# Patient Record
Sex: MALE | Race: WHITE
[De-identification: names, ages, dates, MRNs, and addresses within clinical notes are randomized per-mention and may not be internally consistent; named-entity substitution may affect disease eponyms.]

---

## 2020-03-15 ENCOUNTER — HOSPITAL ENCOUNTER (INPATIENT)
Dept: HOSPITAL 56 - MW.ED | Age: 65
LOS: 1 days | Discharge: HOME | DRG: 710 | End: 2020-03-16
Attending: INTERNAL MEDICINE | Admitting: INTERNAL MEDICINE
Payer: COMMERCIAL

## 2020-03-15 DIAGNOSIS — I10: ICD-10-CM

## 2020-03-15 DIAGNOSIS — L03.317: ICD-10-CM

## 2020-03-15 DIAGNOSIS — E78.00: ICD-10-CM

## 2020-03-15 DIAGNOSIS — Z79.82: ICD-10-CM

## 2020-03-15 DIAGNOSIS — E66.01: ICD-10-CM

## 2020-03-15 DIAGNOSIS — K61.1: ICD-10-CM

## 2020-03-15 DIAGNOSIS — A41.9: Primary | ICD-10-CM

## 2020-03-15 DIAGNOSIS — F17.210: ICD-10-CM

## 2020-03-15 LAB
BUN SERPL-MCNC: 8 MG/DL (ref 7–18)
CHLORIDE SERPL-SCNC: 93 MMOL/L (ref 98–107)
CO2 SERPL-SCNC: 24.3 MMOL/L (ref 21–32)
GLUCOSE SERPL-MCNC: 347 MG/DL (ref 74–106)
POTASSIUM SERPL-SCNC: 4 MMOL/L (ref 3.5–5.1)
SODIUM SERPL-SCNC: 133 MMOL/L (ref 136–148)

## 2020-03-15 PROCEDURE — 0D9P0ZZ DRAINAGE OF RECTUM, OPEN APPROACH: ICD-10-PCS | Performed by: SURGERY

## 2020-03-15 NOTE — CT
CT pelvis



Technique: Multiple axial sections through the pelvis were obtained.  
Reconstructed coronal and sagittal images were obtained.  Intravenous contrast 
was utilized.  No oral or rectal contrast was given.



Findings: Soft tissue fullness is seen to the right side of the rectum.  
Borders of this are ill-defined and measure around 5.2 cm.  No well-defined 
abscess is seen.  Radiographically this finding may represent infection as well 
as neoplasm.  Perirectal fat is preserved.



Other findings: Minimal cyst is noted within the right kidney.  Appendix is 
felt to be visualized and is normal.  No pelvic mass or adenopathy is seen.



Bone window settings were reviewed which show no acute osseous finding.  
Degenerative change visualized within the spine with anterior osteophytes as 
well as vacuum disc phenomena within the L5-S1 disc.  Degenerative apophyseal 
change is also noted.



Impression:

1.  Soft tissue fullness to the right side of the rectum.  No well-defined 
abscess is seen.  As mentioned above, this finding may represent infection as 
well as neoplasm.

2.  Other findings as noted above which are felt to be nonacute and incidental.



Diagnostic code #3



Study was dictated in MDT
MTDD

## 2020-03-15 NOTE — PCM.HP.2
H&P History of Present Illness





- General


Date of Service: 03/15/20


Admit Problem/Dx: 


 Admission Diagnosis/Problem





Admission Diagnosis/Problem      Perirectal abscess











- History of Present Illness


Initial Comments - Free Text/Narative: 





64 yo male who presents with several day history of pain and pressure above his 

rectum.  He has notice purulent drainage.  In the ED it started to drain alot 

more and he felt the pressure relieve.  He reports several years ago have a 

perirectal abscess that was treated with antibiotics.  He denies any fevers.


  ** Left Buttock


Pain Score (Numeric/FACES): 8





- Related Data


Allergies/Adverse Reactions: 


 Allergies











Allergy/AdvReac Type Severity Reaction Status Date / Time


 


No Known Allergies Allergy   Verified 03/15/20 11:23











Home Medications: 


 Home Meds





Aspirin [Edgar Chewable Aspirin] 1 tab PO DAILY 10/20/14 [History]











Past Medical History


Cardiovascular History: Reports: High Cholesterol, Hypertension


Dermatologic History: Reports: Other (See Below)


Other Dermatologic History: Surigcal draining of abscess





- Infectious Disease History


Infectious Disease History: Reports: Chicken Pox, Mumps





- Past Surgical History


Cardiovascular Surgical History: Reports: None


Dermatological Surgical History: Reports: None





Social & Family History





- Family History


Family Medical History: Noncontributory





- Tobacco Use


Smoking Status *Q: Current Every Day Smoker


Years of Tobacco use: 30


Packs/Tins Daily: 1





- Caffeine Use


Caffeine Use: Reports: Coffee, Soda





- Recreational Drug Use


Recreational Drug Use: No





H&P Review of Systems





- Review of Systems:


Review Of Systems: Comprehensive ROS is negative, except as noted in HPI.





Exam





- Exam


Exam: See Below





- Vital Signs


Vital Signs: 


 Last Vital Signs











Temp  36.6 C   03/15/20 13:51


 


Pulse  118 H  03/15/20 13:51


 


Resp  16   03/15/20 13:51


 


BP  159/105 H  03/15/20 13:51


 


Pulse Ox  96   03/15/20 13:51











Weight: 90.718 kg





- Exam


General: Alert, Oriented


HEENT: Mucosa Moist & Pink


Neck: Supple


Lungs: Clear to Auscultation, Normal Respiratory Effort


Cardiovascular: Regular Rate, Regular Rhythm


GI/Abdominal Exam: Soft, Non-Tender


Rectal (Males) Exam: Other (4-5 cm area of induration above the rectum with 

central area of fluctuance, no purulent dranage was noted but bed and breif was 

wet with rust colored fluid)





- Patient Data


Lab Results Last 24 hrs: 


 Laboratory Results - last 24 hr











  03/15/20 03/15/20 03/15/20 Range/Units





  11:27 11:27 13:55 


 


WBC  11.74 H    (4.0-11.0)  K/uL


 


RBC  5.42    (4.50-5.90)  M/uL


 


Hgb  19.2 H    (13.0-17.0)  g/dL


 


Hct  51.8 H    (38.0-50.0)  %


 


MCV  95.6    (80.0-98.0)  fL


 


MCH  35.4 H    (27.0-32.0)  pg


 


MCHC  37.1 H    (31.0-37.0)  g/dL


 


RDW Std Deviation  42.1    (28.0-62.0)  fl


 


RDW Coeff of Mikaela  12    (11.0-15.0)  %


 


Plt Count  162    (150-400)  K/uL


 


MPV  11.20    (7.40-12.00)  fL


 


Neut % (Auto)  67.7    (48.0-80.0)  %


 


Lymph % (Auto)  19.8    (16.0-40.0)  %


 


Mono % (Auto)  11.8    (0.0-15.0)  %


 


Eos % (Auto)  0.3    (0.0-7.0)  %


 


Baso % (Auto)  0.4    (0.0-1.5)  %


 


Neut # (Auto)  7.9 H    (1.4-5.7)  K/uL


 


Lymph # (Auto)  2.3    (0.6-2.4)  K/uL


 


Mono # (Auto)  1.4 H    (0.0-0.8)  K/uL


 


Eos # (Auto)  0.0    (0.0-0.7)  K/uL


 


Baso # (Auto)  0.1    (0.0-0.1)  K/uL


 


Nucleated RBC %  0.0    /100WBC


 


Nucleated RBCs #  0    K/uL


 


Lactate    2.4 H*  (0.20-2.00)  mmol/L


 


Sodium   133 L   (136-148)  mmol/L


 


Potassium   4.0   (3.5-5.1)  mmol/L


 


Chloride   93 L   ()  mmol/L


 


Carbon Dioxide   24.3   (21.0-32.0)  mmol/L


 


BUN   8   (7.0-18.0)  mg/dL


 


Creatinine   1.0   (0.8-1.3)  mg/dL


 


Est Cr Clr Drug Dosing   74.01   mL/min


 


Estimated GFR (MDRD)   > 60.0   ml/min


 


Glucose   347 H   ()  mg/dL


 


Calcium   10.0   (8.5-10.1)  mg/dL


 


Total Bilirubin   1.0   (0.2-1.0)  mg/dL


 


AST   112 H   (15-37)  IU/L


 


ALT   142 H   (14-63)  IU/L


 


Alkaline Phosphatase   137 H   ()  U/L


 


Total Protein   8.3 H   (6.4-8.2)  g/dL


 


Albumin   3.8   (3.4-5.0)  g/dL


 


Globulin   4.5 H   (2.6-4.0)  g/dL


 


Albumin/Globulin Ratio   0.8 L   (0.9-1.6)  











Result Diagrams: 


 03/16/20 06:55





 03/16/20 06:55





Sepsis Event Note





- Evaluation


Sepsis Screening Result: No Definite Risk





- Focused Exam


Vital Signs: 


 Vital Signs











  Temp Pulse Resp BP Pulse Ox


 


 03/15/20 13:51  36.6 C  118 H  16  159/105 H  96


 


 03/15/20 11:21  36.8 C  122 H  19  168/100 H  97











Date Exam was Performed: 03/16/20


Time Exam was Performed: 08:04


Problem List Initiated/Reviewed/Updated: Yes


Orders Last 24hrs: 


 Active Orders 24 hr











 Category Date Time Status


 


 Admission Status [Patient Status] [ADT] Stat ADT  03/15/20 14:46 Active


 


 Antiembolic Devices [RC] PER UNIT ROUTINE Care  03/15/20 15:31 Ordered


 


 Blood Glucose Check, Bedside [RC] TIDMEALS Care  03/15/20 15:30 Ordered


 


 Oxygen Therapy [RC] PRN Care  03/15/20 15:30 Ordered


 


 Up ad Eliz [RC] ASDIRECTED Care  03/15/20 15:30 Ordered


 


 VTE/DVT Education [RC] PER UNIT ROUTINE Care  03/15/20 15:30 Ordered


 


 Vital Signs [RC] Q4H Care  03/15/20 15:30 Ordered


 


 American Diabetic [employer] Diet [DIET] Diet  03/15/20 Breakfast Ordered


 


 Pelvis w Cont [CT] Stat Exams  03/15/20 11:13 Taken


 


 BASIC METABOLIC PANEL,BMP [CHEM] AM Lab  03/16/20 05:11 Ordered


 


 CBC WITH AUTO DIFF [HEME] AM Lab  03/16/20 05:11 Ordered


 


 CULTURE BLOOD [BC] Stat Lab  03/15/20 13:55 Received


 


 CULTURE BLOOD [BC] Stat Lab  03/15/20 14:06 Received


 


 LACTIC ACID,WHOLE BLOOD [BG] Q6H Lab  03/15/20 19:00 Ordered


 


 LACTIC ACID,WHOLE BLOOD [BG] Q6H Lab  03/16/20 01:00 Ordered


 


 LACTIC ACID,WHOLE BLOOD [BG] Q6H Lab  03/16/20 07:00 Ordered


 


 Pharmacy to Dose - Vancomycin Med  03/15/20 15:30 Ordered





 1 dose .XX ASDIRECTED   


 


 Piperacillin/Tazobactam [Piperacil-Tazobact] 3.375 gm Med  03/15/20 15:30 

Ordered





 Sodium Chloride 0.9% [Normal Saline] 50 ml   





 IV Q6H   


 


 Sodium Chloride 0.9% [Normal Saline] 1,000 ml Med  03/15/20 13:36 Active





 IV STAT   


 


 Blood Culture x2 Reflex Set [OM.PC] Stat Oth  03/15/20 13:47 Ordered


 


 Sequential Compression Device [OM.PC] Per Unit Routine Oth  03/15/20 15:30 

Ordered


 


 Resuscitation Status Routine Resus Stat  03/15/20 15:30 Ordered








 Medication Orders





Sodium Chloride (Normal Saline)  1,000 mls @ 125 mls/hr IV STAT ONE


   Stop: 03/15/20 21:35


   Last Infusion: 03/15/20 14:49  Dose: 300 mls/hr


   Infusion: 03/15/20 14:48  Dose: 999 mls/hr


   Admin: 03/15/20 13:40  Dose: 125 mls/hr


Piperacillin Sod/Tazobactam (Sod 3.375 gm/ Sodium Chloride)  50 mls @ 100 mls/

hr IV Q6H [facility]


Vancomycin HCl (Pharmacy To Dose - Vancomycin)  1 dose .XX ASDIRECTED [facility]








Assessment/Plan Comment:: 





64 yo male admitted for perirectal abscess with sepsis.  We will treat with 

Vancomycin and Zosyn.  Patient feeling better after IV fluids.  We will trend 

lactic acid.  Dr. Flores has been consulted

## 2020-03-15 NOTE — PCM.OPNOTE
- General Post-Op/Procedure Note


Date of Surgery/Procedure: 03/15/20


Operative Procedure(s): i/d perirectal abscess


Findings: 





large amt foul smelling, 75 cc necrotic liquified pus rushing out, wound 2.1 cm

, packed w 1/4 in iodoform gauze;[id number]


Pre Op Diagnosis: perirectal abscess


Post-Op Diagnosis: Same


Anesthesia Technique: Local


Primary Surgeon: Mckay Flores


Pathology: 





gstain, c n s


Complications: None


Condition: Fair


Free Text/Narrative:: 


 Intake & Output











 03/15/20 03/15/20 03/15/20





 06:59 14:59 22:59


 


Intake Total   999


 


Balance   999

## 2020-03-15 NOTE — EDM.PDOC
ED HPI GENERAL MEDICAL PROBLEM





- General


Chief Complaint: Skin Complaint


Stated Complaint: GROWTH/ABCESS


Time Seen by Provider: 03/15/20 10:59


Source of Information: Reports: Patient


History Limitations: Reports: No Limitations





- History of Present Illness


INITIAL COMMENTS - FREE TEXT/NARRATIVE: 


HISTORY AND PHYSICAL:





History of present illness:


Patient is a 65-year-old male who presents to the emergency room with 

complaints of an abscess on his tailbone x 1 week.  He states he has had a lot 

of pain and pressure to the tailbone and can feel a "growth"- especially when 

he sits down.  He denies feeling any drainage from the area.  He states in 2014 

he had a similar abscess that had to be drained and he was able to be 

discharged with antibiotics.  Patient denies any fever, chills, headache, 

change in vision, syncope or near syncope. Denies any chest pain, back pain, 

shortness of breath or cough. Denies any abdominal pain, nausea, vomiting, 

diarrhea, constipation or dysuria. Has not noted any blood in urine or stool. 

Patient has been eating and drinking appropriately.





Review of systems: 


As per history of present illness and below otherwise all systems reviewed and 

negative.





Past medical history: 


As per history of present illness and as reviewed below otherwise 

noncontributory.





Surgical history: 


As per history of present illness and as reviewed below otherwise 

noncontributory.





Social history: 


See social history for further information





Family history: 


As per history of present illness and as reviewed below otherwise 

noncontributory.





Physical exam:


General: Well developed and well nourished 65-year-old male.  Alert and 

oriented.  Nontoxic-appearing and in no acute distress.


HEENT: Atraumatic, normocephalic, pupils equal and reactive bilaterally, 

negative for conjunctival pallor or scleral icterus, mucous membranes moist, 

TMs normal bilaterally, throat clear, neck supple, nontender, trachea midline. 

No drooling or trismus noted. No meningeal signs. No hot potato voice noted. 


Lungs: Clear to auscultation, breath sounds equal bilaterally, chest nontender.


Heart: S1S2, regular rate and rhythm without overt murmur


Abdomen: Soft, nondistended, nontender. Negative for masses or 

hepatosplenomegaly. Negative for costovertebral tenderness.


Pelvis: Stable nontender.


Rectal: Good rectal tone.  Unable to palpate the abscess that is approximately 

2 fingerbreadths above the rectum.  See SKIN for details of abscess.


Skin: Quarter sized area of maceration with serosanguanous drainage. Fluctuant 

and very tender to touch. Surrounding erythema. Remaining skin is intact, warm, 

dry. No lesions or rashes noted.


Extremities: Atraumatic, moves all extremities per self without difficulty or 

deficits, negative for cords or calf pain. Neurovascular unremarkable.


Neuro: Awake, alert, oriented. Cranial nerves II through XII unremarkable. 

Cerebellum unremarkable. Motor and sensory unremarkable throughout. Exam 

nonfocal.





Notes:


CT had some delays as the report was not crossing over.  Physical copy of the 

CT shows soft tissue fullness is seen in the right side of the rectum.  Borders 

of this are ill-defined and measure around 5.2 cm.  No well-defined abscess is 

seen.  Radiographically this finding may represent infection as well as 

neoplasm.  Patient does have an elevated white count. Elevated lactate; NS 

bolus initiated.  Dr. Castro, hospitalist on-call, was consulted on this 

patient.  He wants to come and evaluate the patient before admission.  He did 

recommend vancomycin, blood cultures and lactate be ordered at this time.  

Patient was made aware of the CT findings and is agreeable to plan of care.





Diagnostics:


CBC, CMP, Pelvis CT, BC, x2, lactic





Therapeutics:


Toradol, Morphine, NS, Vancomycin





Impression: 


Gluteal Cellulitis and perirectal abscess





Plan:


Inpatient admission





Definitive disposition and diagnosis as appropriate pending reevaluation and 

review of above.





  ** Left Buttock


Pain Score (Numeric/FACES): 8





- Related Data


 Allergies











Allergy/AdvReac Type Severity Reaction Status Date / Time


 


No Known Allergies Allergy   Verified 03/15/20 11:23











Home Meds: 


 Home Meds





Aspirin [Edgar Chewable Aspirin] 1 tab PO DAILY 10/20/14 [History]











ED ROS GENERAL





- Review of Systems


Review Of Systems: Comprehensive ROS is negative, except as noted in HPI.





ED EXAM, SKIN/RASH


Exam: See Below (See dictation)





Course





- Vital Signs


Last Recorded V/S: 


 Last Vital Signs











Temp  97.8 F   03/15/20 13:51


 


Pulse  118 H  03/15/20 13:51


 


Resp  16   03/15/20 13:51


 


BP  159/105 H  03/15/20 13:51


 


Pulse Ox  96   03/15/20 13:51














- Orders/Labs/Meds


Orders: 


 Active Orders 24 hr











 Category Date Time Status


 


 Admission Status [Patient Status] [ADT] Stat ADT  03/15/20 14:46 Active


 


 Pelvis w Cont [CT] Stat Exams  03/15/20 11:13 Taken


 


 CULTURE BLOOD [BC] Stat Lab  03/15/20 13:55 Received


 


 CULTURE BLOOD [BC] Stat Lab  03/15/20 14:06 Received


 


 Sodium Chloride 0.9% [Normal Saline] 1,000 ml Med  03/15/20 13:36 Active





 IV STAT   


 


 Vancomycin 1 gm Med  03/15/20 13:47 Active





 Sodium Chloride 0.9% [Normal Saline (AdvBag)] 250 ml   





 IV ONETIME   


 


 Blood Culture x2 Reflex Set [OM.PC] Stat Oth  03/15/20 13:47 Ordered








 Medication Orders





Sodium Chloride (Normal Saline)  1,000 mls @ 125 mls/hr IV STAT ONE


   Stop: 03/15/20 21:35


   Last Infusion: 03/15/20 14:48  Dose: 999 mls/hr


   Admin: 03/15/20 13:40  Dose: 125 mls/hr


Vancomycin HCl 1 gm/ Sodium (Chloride)  250 mls @ 166 mls/hr IV ONETIME ONE


   Stop: 03/15/20 15:17


   Last Admin: 03/15/20 14:22  Dose: 166 mls/hr








Labs: 


 Laboratory Tests











  03/15/20 03/15/20 03/15/20 Range/Units





  11:27 11:27 13:55 


 


WBC  11.74 H    (4.0-11.0)  K/uL


 


RBC  5.42    (4.50-5.90)  M/uL


 


Hgb  19.2 H    (13.0-17.0)  g/dL


 


Hct  51.8 H    (38.0-50.0)  %


 


MCV  95.6    (80.0-98.0)  fL


 


MCH  35.4 H    (27.0-32.0)  pg


 


MCHC  37.1 H    (31.0-37.0)  g/dL


 


RDW Std Deviation  42.1    (28.0-62.0)  fl


 


RDW Coeff of Mikaela  12    (11.0-15.0)  %


 


Plt Count  162    (150-400)  K/uL


 


MPV  11.20    (7.40-12.00)  fL


 


Neut % (Auto)  67.7    (48.0-80.0)  %


 


Lymph % (Auto)  19.8    (16.0-40.0)  %


 


Mono % (Auto)  11.8    (0.0-15.0)  %


 


Eos % (Auto)  0.3    (0.0-7.0)  %


 


Baso % (Auto)  0.4    (0.0-1.5)  %


 


Neut # (Auto)  7.9 H    (1.4-5.7)  K/uL


 


Lymph # (Auto)  2.3    (0.6-2.4)  K/uL


 


Mono # (Auto)  1.4 H    (0.0-0.8)  K/uL


 


Eos # (Auto)  0.0    (0.0-0.7)  K/uL


 


Baso # (Auto)  0.1    (0.0-0.1)  K/uL


 


Nucleated RBC %  0.0    /100WBC


 


Nucleated RBCs #  0    K/uL


 


Lactate    2.4 H*  (0.20-2.00)  mmol/L


 


Sodium   133 L   (136-148)  mmol/L


 


Potassium   4.0   (3.5-5.1)  mmol/L


 


Chloride   93 L   ()  mmol/L


 


Carbon Dioxide   24.3   (21.0-32.0)  mmol/L


 


BUN   8   (7.0-18.0)  mg/dL


 


Creatinine   1.0   (0.8-1.3)  mg/dL


 


Est Cr Clr Drug Dosing   74.01   mL/min


 


Estimated GFR (MDRD)   > 60.0   ml/min


 


Glucose   347 H   ()  mg/dL


 


Calcium   10.0   (8.5-10.1)  mg/dL


 


Total Bilirubin   1.0   (0.2-1.0)  mg/dL


 


AST   112 H   (15-37)  IU/L


 


ALT   142 H   (14-63)  IU/L


 


Alkaline Phosphatase   137 H   ()  U/L


 


Total Protein   8.3 H   (6.4-8.2)  g/dL


 


Albumin   3.8   (3.4-5.0)  g/dL


 


Globulin   4.5 H   (2.6-4.0)  g/dL


 


Albumin/Globulin Ratio   0.8 L   (0.9-1.6)  











Meds: 


Medications











Generic Name Dose Route Start Last Admin





  Trade Name Freq  PRN Reason Stop Dose Admin


 


Sodium Chloride  1,000 mls @ 125 mls/hr  03/15/20 13:36  03/15/20 14:48





  Normal Saline  IV  03/15/20 21:35  999 mls/hr





  STAT ONE   Infusion





     





     





     





     


 


Vancomycin HCl 1 gm/ Sodium  250 mls @ 166 mls/hr  03/15/20 13:47  03/15/20 14:

22





  Chloride  IV  03/15/20 15:17  166 mls/hr





  ONETIME ONE   Administration





     





     





     





     














Discontinued Medications














Generic Name Dose Route Start Last Admin





  Trade Name Zachary  PRN Reason Stop Dose Admin


 


Ketorolac Tromethamine  30 mg  03/15/20 11:18  03/15/20 11:35





  Toradol  IVPUSH  03/15/20 11:19  30 mg





  ONETIME ONE   Administration





     





     





     





     


 


Lidocaine HCl  5 ml  03/15/20 12:52  





  Xylocaine-Mpf 1%  INJECT  03/15/20 12:53  





  ONETIME ONE   





     





     





     





     


 


Morphine Sulfate  2 mg  03/15/20 13:53  03/15/20 14:23





  Morphine  IVPUSH  03/15/20 13:54  2 mg





  ONETIME ONE   Administration





     





     





     





     














Departure





- Departure


Time of Disposition: 14:50


Disposition: Admitted As Inpatient 64


Clinical Impression: 


 Perirectal abscess





Cellulitis


Qualifiers:


 Site of cellulitis: buttock Qualified Code(s): L03.317 - Cellulitis of buttock








- Discharge Information


Referrals: 


Viktor Schaefer MD [Primary Care Provider] - 


Forms:  ED Department Discharge





Sepsis Event Note





- Focused Exam


Vital Signs: 


 Vital Signs











  Temp Pulse Resp BP Pulse Ox


 


 03/15/20 13:51  97.8 F  118 H  16  159/105 H  96


 


 03/15/20 11:21  98.3 F  122 H  19  168/100 H  97











Date Exam was Performed: 03/15/20


Time Exam was Performed: 14:48





- My Orders


Last 24 Hours: 


My Active Orders





03/15/20 11:13


Pelvis w Cont [CT] Stat 





03/15/20 13:36


Sodium Chloride 0.9% [Normal Saline] 1,000 ml IV STAT 





03/15/20 13:47


Vancomycin 1 gm   Sodium Chloride 0.9% [Normal Saline (AdvBag)] 250 ml IV 

ONETIME 


Blood Culture x2 Reflex Set [OM.PC] Stat 





03/15/20 13:55


CULTURE BLOOD [BC] Stat 





03/15/20 14:06


CULTURE BLOOD [BC] Stat 





03/15/20 14:46


Admission Status [Patient Status] [ADT] Stat 














- Assessment/Plan


Last 24 Hours: 


My Active Orders





03/15/20 11:13


Pelvis w Cont [CT] Stat 





03/15/20 13:36


Sodium Chloride 0.9% [Normal Saline] 1,000 ml IV STAT 





03/15/20 13:47


Vancomycin 1 gm   Sodium Chloride 0.9% [Normal Saline (AdvBag)] 250 ml IV 

ONETIME 


Blood Culture x2 Reflex Set [OM.PC] Stat 





03/15/20 13:55


CULTURE BLOOD [BC] Stat 





03/15/20 14:06


CULTURE BLOOD [BC] Stat 





03/15/20 14:46


Admission Status [Patient Status] [ADT] Stat

## 2020-03-15 NOTE — PCM.SN
- Free Text/Narrative


Note: 





i/d done at bedside; pt can be discharged home in the morning from surg 

standpoint; wound care, 1) warm water sitz bath every BM till wound heal; 2) 

clindamycin 300 mg po tid X 1 wk 3) wound packing change everyday using 

iodoform gauze 1/4; scripts for abx and pain meds written; tks for the consult 

and care of this nice gentleman; if dc home, kaity rivera 1 - 2 wks
- Free Text/Narrative


Note: 


pt seen, chart reviewed; perirectal abscess, would benefit timely i/d; pt 

concurred, proceed w id; [id number]
Right Hand/Right Foot

## 2020-03-16 VITALS — SYSTOLIC BLOOD PRESSURE: 129 MMHG | HEART RATE: 67 BPM | DIASTOLIC BLOOD PRESSURE: 64 MMHG

## 2020-03-16 LAB
BUN SERPL-MCNC: 6 MG/DL (ref 7–18)
CHLORIDE SERPL-SCNC: 100 MMOL/L (ref 98–107)
CO2 SERPL-SCNC: 27 MMOL/L (ref 21–32)
GLUCOSE SERPL-MCNC: 297 MG/DL (ref 74–106)
POTASSIUM SERPL-SCNC: 4.2 MMOL/L (ref 3.5–5.1)
SODIUM SERPL-SCNC: 135 MMOL/L (ref 136–148)

## 2020-03-16 NOTE — [id number]
**Discharge Summary





- Discharge Data


Discharge Date: 03/16/20


Discharge Disposition: Home, Self-Care 01


Condition: Fair





- Referral to Home Health


Primary Care Physician: 


Viktor Schaefer MD








- Patient Summary/Data


Operative Procedure(s) Performed: i/d perirectal abscess


Hospital Course: 


66 yo male who was admitted for a perirectal abscess.  He presents with several 

day history of pain and pressure above his rectum.  WBC was 11,000 and Lactic 

acid was 2.4.  CT scan of the pelvis showed a 5 cm tissue fullness on the right 

of the rectum.  He was treated with Vancomycin, Zosyn, and IV fluids.  Dr. Flores 

was consulted and performed and I&D of the perirectal abscess.  The following 

morning his tachycardia, leukocytosis and lactic acid returned to normal.  He 

is requesting discharge today.  Patient is to be discharge with a week of 

clindamycin, percocet and to follow up with Dr. Flores.





- Discharge Plan


Home Medications: 


 Home Meds





Aspirin [Edgar Chewable Aspirin] 1 tab PO DAILY 10/20/14 [History]








Referrals: 


Viktor Schaefer MD [Primary Care Provider] - 


Mckay Flores MD [Physician] - 





- Discharge Summary/Plan Comment


DC Time >30 min.: No





- Patient Data


Vitals - Most Recent: 


 Last Vital Signs











Temp  36.1 C   03/16/20 04:01


 


Pulse  76   03/16/20 04:01


 


Resp  17   03/16/20 04:01


 


BP  139/69   03/16/20 04:01


 


Pulse Ox  98   03/16/20 04:01











Weight - Most Recent: 896.032 kg


I&O - Last 24 hours: 


 Intake & Output











 03/15/20 03/16/20 03/16/20





 22:59 06:59 14:59


 


Intake Total 999 3290 


 


Output Total  950 


 


Balance 999 2340 











Lab Results - Last 24 hrs: 


 Laboratory Results - last 24 hr











  03/15/20 03/15/20 03/15/20 Range/Units





  11:27 11:27 13:55 


 


WBC  11.74 H    (4.0-11.0)  K/uL


 


RBC  5.42    (4.50-5.90)  M/uL


 


Hgb  19.2 H    (13.0-17.0)  g/dL


 


Hct  51.8 H    (38.0-50.0)  %


 


MCV  95.6    (80.0-98.0)  fL


 


MCH  35.4 H    (27.0-32.0)  pg


 


MCHC  37.1 H    (31.0-37.0)  g/dL


 


RDW Std Deviation  42.1    (28.0-62.0)  fl


 


RDW Coeff of Mikaela  12    (11.0-15.0)  %


 


Plt Count  162    (150-400)  K/uL


 


MPV  11.20    (7.40-12.00)  fL


 


Neut % (Auto)  67.7    (48.0-80.0)  %


 


Lymph % (Auto)  19.8    (16.0-40.0)  %


 


Mono % (Auto)  11.8    (0.0-15.0)  %


 


Eos % (Auto)  0.3    (0.0-7.0)  %


 


Baso % (Auto)  0.4    (0.0-1.5)  %


 


Neut # (Auto)  7.9 H    (1.4-5.7)  K/uL


 


Lymph # (Auto)  2.3    (0.6-2.4)  K/uL


 


Mono # (Auto)  1.4 H    (0.0-0.8)  K/uL


 


Eos # (Auto)  0.0    (0.0-0.7)  K/uL


 


Baso # (Auto)  0.1    (0.0-0.1)  K/uL


 


Nucleated RBC %  0.0    /100WBC


 


Nucleated RBCs #  0    K/uL


 


Lactate    2.4 H*  (0.20-2.00)  mmol/L


 


Sodium   133 L   (136-148)  mmol/L


 


Potassium   4.0   (3.5-5.1)  mmol/L


 


Chloride   93 L   ()  mmol/L


 


Carbon Dioxide   24.3   (21.0-32.0)  mmol/L


 


BUN   8   (7.0-18.0)  mg/dL


 


Creatinine   1.0   (0.8-1.3)  mg/dL


 


Est Cr Clr Drug Dosing   74.01   mL/min


 


Estimated GFR (MDRD)   > 60.0   ml/min


 


Glucose   347 H   ()  mg/dL


 


POC Glucose     ()  mg/dL


 


Calcium   10.0   (8.5-10.1)  mg/dL


 


Total Bilirubin   1.0   (0.2-1.0)  mg/dL


 


AST   112 H   (15-37)  IU/L


 


ALT   142 H   (14-63)  IU/L


 


Alkaline Phosphatase   137 H   ()  U/L


 


Total Protein   8.3 H   (6.4-8.2)  g/dL


 


Albumin   3.8   (3.4-5.0)  g/dL


 


Globulin   4.5 H   (2.6-4.0)  g/dL


 


Albumin/Globulin Ratio   0.8 L   (0.9-1.6)  














  03/15/20 03/15/20 03/16/20 Range/Units





  17:13 19:19 01:08 


 


WBC     (4.0-11.0)  K/uL


 


RBC     (4.50-5.90)  M/uL


 


Hgb     (13.0-17.0)  g/dL


 


Hct     (38.0-50.0)  %


 


MCV     (80.0-98.0)  fL


 


MCH     (27.0-32.0)  pg


 


MCHC     (31.0-37.0)  g/dL


 


RDW Std Deviation     (28.0-62.0)  fl


 


RDW Coeff of Mikaela     (11.0-15.0)  %


 


Plt Count     (150-400)  K/uL


 


MPV     (7.40-12.00)  fL


 


Neut % (Auto)     (48.0-80.0)  %


 


Lymph % (Auto)     (16.0-40.0)  %


 


Mono % (Auto)     (0.0-15.0)  %


 


Eos % (Auto)     (0.0-7.0)  %


 


Baso % (Auto)     (0.0-1.5)  %


 


Neut # (Auto)     (1.4-5.7)  K/uL


 


Lymph # (Auto)     (0.6-2.4)  K/uL


 


Mono # (Auto)     (0.0-0.8)  K/uL


 


Eos # (Auto)     (0.0-0.7)  K/uL


 


Baso # (Auto)     (0.0-0.1)  K/uL


 


Nucleated RBC %     /100WBC


 


Nucleated RBCs #     K/uL


 


Lactate   2.1 H*  2.3 H*  (0.20-2.00)  mmol/L


 


Sodium     (136-148)  mmol/L


 


Potassium     (3.5-5.1)  mmol/L


 


Chloride     ()  mmol/L


 


Carbon Dioxide     (21.0-32.0)  mmol/L


 


BUN     (7.0-18.0)  mg/dL


 


Creatinine     (0.8-1.3)  mg/dL


 


Est Cr Clr Drug Dosing     mL/min


 


Estimated GFR (MDRD)     ml/min


 


Glucose     ()  mg/dL


 


POC Glucose  215 H    ()  mg/dL


 


Calcium     (8.5-10.1)  mg/dL


 


Total Bilirubin     (0.2-1.0)  mg/dL


 


AST     (15-37)  IU/L


 


ALT     (14-63)  IU/L


 


Alkaline Phosphatase     ()  U/L


 


Total Protein     (6.4-8.2)  g/dL


 


Albumin     (3.4-5.0)  g/dL


 


Globulin     (2.6-4.0)  g/dL


 


Albumin/Globulin Ratio     (0.9-1.6)  














  03/16/20 03/16/20 03/16/20 Range/Units





  06:55 06:55 06:55 


 


WBC   7.74   (4.0-11.0)  K/uL


 


RBC   4.33 L   (4.50-5.90)  M/uL


 


Hgb   14.6   (13.0-17.0)  g/dL


 


Hct   42.1   (38.0-50.0)  %


 


MCV   97.2   (80.0-98.0)  fL


 


MCH   33.7 H   (27.0-32.0)  pg


 


MCHC   34.7   (31.0-37.0)  g/dL


 


RDW Std Deviation   43.1   (28.0-62.0)  fl


 


RDW Coeff of Mikaela   12   (11.0-15.0)  %


 


Plt Count   157   (150-400)  K/uL


 


MPV   9.60   (7.40-12.00)  fL


 


Neut % (Auto)   50.6   (48.0-80.0)  %


 


Lymph % (Auto)   31.3   (16.0-40.0)  %


 


Mono % (Auto)   14.1   (0.0-15.0)  %


 


Eos % (Auto)   3.2   (0.0-7.0)  %


 


Baso % (Auto)   0.8   (0.0-1.5)  %


 


Neut # (Auto)   3.9   (1.4-5.7)  K/uL


 


Lymph # (Auto)   2.4   (0.6-2.4)  K/uL


 


Mono # (Auto)   1.1 H   (0.0-0.8)  K/uL


 


Eos # (Auto)   0.3   (0.0-0.7)  K/uL


 


Baso # (Auto)   0.1   (0.0-0.1)  K/uL


 


Nucleated RBC %   0.0   /100WBC


 


Nucleated RBCs #   0   K/uL


 


Lactate  1.2    (0.20-2.00)  mmol/L


 


Sodium    135 L  (136-148)  mmol/L


 


Potassium    4.2  (3.5-5.1)  mmol/L


 


Chloride    100  ()  mmol/L


 


Carbon Dioxide    27.0  (21.0-32.0)  mmol/L


 


BUN    6 L  (7.0-18.0)  mg/dL


 


Creatinine    0.8  (0.8-1.3)  mg/dL


 


Est Cr Clr Drug Dosing    92.52  mL/min


 


Estimated GFR (MDRD)    > 60.0  ml/min


 


Glucose    297 H  ()  mg/dL


 


POC Glucose     ()  mg/dL


 


Calcium    8.2 L  (8.5-10.1)  mg/dL


 


Total Bilirubin     (0.2-1.0)  mg/dL


 


AST     (15-37)  IU/L


 


ALT     (14-63)  IU/L


 


Alkaline Phosphatase     ()  U/L


 


Total Protein     (6.4-8.2)  g/dL


 


Albumin     (3.4-5.0)  g/dL


 


Globulin     (2.6-4.0)  g/dL


 


Albumin/Globulin Ratio     (0.9-1.6)  











Med Orders - Current: 


 Current Medications





Acetaminophen (Tylenol)  650 mg PO Q6H PRN


   PRN Reason: Pain (mild 1-3)


Piperacillin Sod/Tazobactam (Sod 3.375 gm/ Sodium Chloride)  50 mls @ 100 mls/

hr IV Q6H [facility]


   Last Admin: 03/16/20 04:02 Dose:  100 mls/hr


Sodium Chloride (Normal Saline)  1,000 mls @ 125 mls/hr IV ASDIRECTED [facility]


   Last Admin: 03/16/20 07:32 Dose:  125 mls/hr


Vancomycin HCl 1.5 gm/ Premix  300 mls @ 150 mls/hr IV Q12H [facility]


   Last Admin: 03/15/20 23:40 Dose:  150 mls/hr


Sodium Chloride (Normal Saline)  500 mls @ 1,000 mls/hr IV .BOLUS ABDIAZIZ


   Last Admin: 03/16/20 06:14 Dose:  1,000 mls/hr


Insulin Aspart (Novolog)  0 unit SUBCUT TIDAC [facility]; Protocol


Morphine Sulfate (Morphine)  2 mg IVPUSH Q3H PRN


   PRN Reason: Pain (severe 7-10)


   Last Admin: 03/16/20 06:19 Dose:  2 mg


Nicotine (Habitrol)  14 mg TRDERM DAILY [facility]


   Last Admin: 03/15/20 17:57 Dose:  14 mg


Oxycodone HCl (Oxycodone)  5 mg PO Q4H PRN


   PRN Reason: Pain (moderate 4-6)


   Last Admin: 03/15/20 23:39 Dose:  5 mg


Vancomycin HCl (Pharmacy To Dose - Vancomycin)  1 dose .XX ASDIRECTED [facility]





Discontinued Medications





Sodium Chloride (Normal Saline)  1,000 mls @ 125 mls/hr IV STAT ONE


   Stop: 03/15/20 21:35


   Last Infusion: 03/15/20 15:48 Dose:  999 mls/hr


Vancomycin HCl 1 gm/ Sodium (Chloride)  250 mls @ 166 mls/hr IV ONETIME ONE


   Stop: 03/15/20 15:17


   Last Admin: 03/15/20 14:22 Dose:  166 mls/hr


Vancomycin HCl 1.5 gm/ Premix  300 mls @ 150 mls/hr IV Q12H [facility]


   Last Admin: 03/16/20 05:29 Dose:  Not Given


Insulin Aspart (Novolog) Confirm Administered Dose 300 unit .ROUTE .STK-MED ONE


   Stop: 03/15/20 18:09


   Last Admin: 03/15/20 18:12 Dose:  2 units


Iopamidol (Isovue Multipack-370 (76%))  100 ml IVPUSH ONETIME STA


   Stop: 03/15/20 16:00


   Last Admin: 03/15/20 16:00 Dose:  100 ml


Ketorolac Tromethamine (Toradol)  30 mg IVPUSH ONETIME ONE


   Stop: 03/15/20 11:19


   Last Admin: 03/15/20 11:35 Dose:  30 mg


Lidocaine HCl (Xylocaine-Mpf 1%)  5 ml INJECT ONETIME ONE


   Stop: 03/15/20 12:53


   Last Admin: 03/15/20 15:01 Dose:  Not Given


Lidocaine/Epinephrine (Xylocaine 1% With Epinephrine 1:100,000)  10 ml INJECT 

ONETIME ONE


   Stop: 03/15/20 19:43


   Last Admin: 03/15/20 21:25 Dose:  6 ml


Morphine Sulfate (Morphine)  2 mg IVPUSH ONETIME ONE


   Stop: 03/15/20 13:54


   Last Admin: 03/15/20 14:23 Dose:  2 mg


Morphine Sulfate (Morphine)  3 mg IVPUSH ONETIME ONE


   Stop: 03/15/20 19:31


   Last Admin: 03/15/20 19:48 Dose:  3 mg

## 2020-03-16 NOTE — CONS
DATE OF CONSULTATION:

03/15/2020

 

YOB: 1955

 

PRIMARY CARE PHYSICIAN:

Viktor Schaefer M.D.

 

Consult was called by Dr. Castro.  The patient is seen shortly after.

 

REASON FOR CONSULTATION:

Concerning question, perirectal abscess.

 

HISTORY OF PRESENT ILLNESS:

The patient is 65 years old morbidly obese gentleman, heavy smoker and

complaining with a 10-day history of gradual onset of rectal pain, seen in

emergency room today and was admitted to medical for further management.  In the

emergency room, apparently it ruptured and drained a lot. The patient's pain is

relieved. The patient denied fever, chills, or diarrhea.

 

PAST MEDICAL HISTORY:

Significant for prediabetic.  No MI, CVA, hypertension.

 

Mallampati classification, the patient is class III and denied any past history

of malignant hyperthermia.

 

PAST SURGICAL HISTORY:

No abdominal surgery.  The patient does have a history of a perirectal abscess

drained 20 years ago.

 

ALLERGIES:

Please refer to nursing for details.

 

MEDICATION:

Please refer to nursing for details.

 

PHYSICAL EXAMINATION:

GENERAL:  A very pleasant gentleman, in no acute distress.

HEENT:  Normocephalic and atraumatic.  Sclerae anicteric.

LUNGS:  Clear to auscultation.

HEART:  Regular rate and rhythm.

ABDOMEN:  Soft, nondistended.  No pulsating tender midline abdominal structure.

:  Male.  The patient is in prone position.  On the right side almost like in

the sacral area there is blister-like fluctuance.  There is no sacral tenderness

and no expressed material.  It is pretty full.  Whatever the situation is, it

looks like it has been refilled, although it has been ruptured.

 

RECOMMENDATION:

Bedside I and D and followed with Wound Care with dressing change and plan has

been discussed with patient.  The patient is thinking about it.  If not bedside

I and D, we will take to the operating room for I and D and follow with Wound

Care.  Benefits in this particular situation, risks involved with postoperative

course, pain and wound care and dressing change as well as recurrence as the

patient is a heavy smoker.

 

As always, thank you for your kind referral.

 

 

PARDEEP / DEVAN

DD:  03/15/2020 19:34:00

DT:  03/16/2020 00:19:53

Job #:  [id number]

## 2020-03-16 NOTE — OR
SURGEON:

Mckay Flores MD

 

DATE OF PROCEDURE:  03/15/2020

 

PREOPERATIVE DIAGNOSIS:

Perirectal abscess.

 

POSTOPERATIVE DIAGNOSIS:

Perirectal abscess.

 

PROCEDURE PERFORMED:

Incision and drainage.

 

PRIMARY SURGEON:

Mckay Flores MD.

 

COMPLICATIONS:

None.

 

FINDING:

A large amount of foul smelling 75 mL necrotic liquified pus rushing out, and

the wound is 2.1 cm, packed with quarter-inch iodoform gauze.

 

DESCRIPTION OF PROCEDURE:

Procedure performed at bedside after informed consent given.  The patient has

signed consent and risks and benefits discussed.  Procedure then started.  The

patient was in a left decub position, left side down, right side up, and the

perineum area was prepped and draped in a sterile fashion, and using 1%

lidocaine with epi, the area was numbed and using a 15 blade, a small incision

was made right at the fluctuance area, resulting in a large amount of foul

smelling necrotic liquified pus coming out, followed with surgeon's finger to

dislodge all the loculation, and the wound was then packed with a quarter-inch

iodoform gauze and appropriate dressing.  The patient tolerated the procedure

well.  There were no intraoperative complications.  Dr. Flores was present through

the whole procedure.  Gram stain C and S sent from drainage.

 

 

PARDEEP / DEVAN

DD:  03/15/2020 20:27:38

DT:  03/16/2020 00:00:06

Job #:  [id number]

## 2023-04-22 ENCOUNTER — HOSPITAL ENCOUNTER (EMERGENCY)
Dept: HOSPITAL 56 - MW.ED | Age: 68
End: 2023-04-22
Payer: COMMERCIAL

## 2023-04-22 VITALS — HEART RATE: 119 BPM | SYSTOLIC BLOOD PRESSURE: 142 MMHG | DIASTOLIC BLOOD PRESSURE: 61 MMHG

## 2023-04-22 DIAGNOSIS — I10: ICD-10-CM

## 2023-04-22 DIAGNOSIS — R56.9: ICD-10-CM

## 2023-04-22 DIAGNOSIS — I46.9: Primary | ICD-10-CM

## 2023-04-22 DIAGNOSIS — Z79.82: ICD-10-CM

## 2023-04-22 DIAGNOSIS — E11.9: ICD-10-CM

## 2023-04-22 DIAGNOSIS — E87.6: ICD-10-CM

## 2023-04-22 LAB
BUN SERPL-MCNC: 8 MG/DL (ref 7–18)
CHLORIDE SERPL-SCNC: 88 MMOL/L (ref 98–107)
CO2 SERPL-SCNC: 32.3 MMOL/L (ref 21–32)
EGFRCR SERPLBLD CKD-EPI 2021: 59 ML/MIN (ref 60–?)
GLUCOSE SERPL-MCNC: 254 MG/DL (ref 74–106)
LIPASE SERPL-CCNC: 33 U/L (ref 73–393)
POTASSIUM SERPL-SCNC: 2 MMOL/L (ref 3.5–5.1)
SODIUM SERPL-SCNC: 139 MMOL/L (ref 136–148)

## 2023-04-22 PROCEDURE — 80053 COMPREHEN METABOLIC PANEL: CPT

## 2023-04-22 PROCEDURE — 80307 DRUG TEST PRSMV CHEM ANLYZR: CPT

## 2023-04-22 PROCEDURE — 85610 PROTHROMBIN TIME: CPT

## 2023-04-22 PROCEDURE — 96374 THER/PROPH/DIAG INJ IV PUSH: CPT

## 2023-04-22 PROCEDURE — 71045 X-RAY EXAM CHEST 1 VIEW: CPT

## 2023-04-22 PROCEDURE — 82009 KETONE BODYS QUAL: CPT

## 2023-04-22 PROCEDURE — 96375 TX/PRO/DX INJ NEW DRUG ADDON: CPT

## 2023-04-22 PROCEDURE — 70450 CT HEAD/BRAIN W/O DYE: CPT

## 2023-04-22 PROCEDURE — 84484 ASSAY OF TROPONIN QUANT: CPT

## 2023-04-22 PROCEDURE — 84443 ASSAY THYROID STIM HORMONE: CPT

## 2023-04-22 PROCEDURE — 83605 ASSAY OF LACTIC ACID: CPT

## 2023-04-22 PROCEDURE — 82803 BLOOD GASES ANY COMBINATION: CPT

## 2023-04-22 PROCEDURE — 85730 THROMBOPLASTIN TIME PARTIAL: CPT

## 2023-04-22 PROCEDURE — 83690 ASSAY OF LIPASE: CPT

## 2023-04-22 PROCEDURE — 99285 EMERGENCY DEPT VISIT HI MDM: CPT

## 2023-04-22 PROCEDURE — 70498 CT ANGIOGRAPHY NECK: CPT

## 2023-04-22 PROCEDURE — 70496 CT ANGIOGRAPHY HEAD: CPT

## 2023-04-22 PROCEDURE — 83735 ASSAY OF MAGNESIUM: CPT

## 2023-04-22 PROCEDURE — 36415 COLL VENOUS BLD VENIPUNCTURE: CPT

## 2023-04-22 PROCEDURE — 85025 COMPLETE CBC W/AUTO DIFF WBC: CPT

## 2023-04-22 PROCEDURE — 82550 ASSAY OF CK (CPK): CPT
